# Patient Record
Sex: MALE | Race: WHITE | NOT HISPANIC OR LATINO | Employment: FULL TIME | URBAN - METROPOLITAN AREA
[De-identification: names, ages, dates, MRNs, and addresses within clinical notes are randomized per-mention and may not be internally consistent; named-entity substitution may affect disease eponyms.]

---

## 2024-08-07 ENCOUNTER — OFFICE VISIT (OUTPATIENT)
Dept: URGENT CARE | Facility: CLINIC | Age: 24
End: 2024-08-07
Payer: COMMERCIAL

## 2024-08-07 VITALS
TEMPERATURE: 98.1 F | DIASTOLIC BLOOD PRESSURE: 80 MMHG | HEIGHT: 74 IN | WEIGHT: 275 LBS | RESPIRATION RATE: 12 BRPM | BODY MASS INDEX: 35.29 KG/M2 | SYSTOLIC BLOOD PRESSURE: 120 MMHG | HEART RATE: 71 BPM | OXYGEN SATURATION: 97 %

## 2024-08-07 DIAGNOSIS — H00.015 HORDEOLUM EXTERNUM OF LEFT LOWER EYELID: ICD-10-CM

## 2024-08-07 PROCEDURE — 99204 OFFICE O/P NEW MOD 45 MIN: CPT | Performed by: NURSE PRACTITIONER

## 2024-08-07 PROCEDURE — 3074F SYST BP LT 130 MM HG: CPT | Performed by: NURSE PRACTITIONER

## 2024-08-07 PROCEDURE — 3079F DIAST BP 80-89 MM HG: CPT | Performed by: NURSE PRACTITIONER

## 2024-08-07 RX ORDER — SULFAMETHOXAZOLE/TRIMETHOPRIM 800-160 MG
1 TABLET ORAL 2 TIMES DAILY
Qty: 10 TABLET | Refills: 0 | Status: SHIPPED | OUTPATIENT
Start: 2024-08-07 | End: 2024-08-12

## 2024-08-07 RX ORDER — ERYTHROMYCIN 5 MG/G
1 OINTMENT OPHTHALMIC 4 TIMES DAILY
Qty: 1 G | Refills: 0 | Status: SHIPPED | OUTPATIENT
Start: 2024-08-07

## 2024-08-08 ASSESSMENT — ENCOUNTER SYMPTOMS
EYE PAIN: 1
DOUBLE VISION: 0
FOREIGN BODY SENSATION: 0
PHOTOPHOBIA: 0
BLURRED VISION: 0
EYE REDNESS: 1
FEVER: 0
EYE DISCHARGE: 0
EYE ITCHING: 0

## 2024-08-08 NOTE — PROGRESS NOTES
"Subjective:   Huber Mccormick is a 23 y.o. male who presents for Eye Problem (L eye swelling, ear rajwinder, jaw popping, rajwinder)      Eye Problem   The left eye is affected. This is a new problem. The current episode started yesterday. The problem occurs constantly. The problem has been gradually worsening. There was no injury mechanism. The pain is moderate. There is No known exposure to pink eye. He Does not wear contacts. Associated symptoms include eye redness. Pertinent negatives include no blurred vision, eye discharge, double vision, fever, foreign body sensation, itching or photophobia. He has tried nothing for the symptoms. The treatment provided no relief.       Review of Systems   Constitutional:  Negative for fever and malaise/fatigue.   Eyes:  Positive for pain and redness. Negative for blurred vision, double vision, photophobia, discharge and itching.       Medications:    erythromycin Oint  sulfamethoxazole-trimethoprim    Allergies: Patient has no known allergies.    Problem List: Huber Mccormick does not have a problem list on file.    Surgical History:  No past surgical history on file.    Past Social Hx: Huber Mccormick  reports that he has never smoked. He has never used smokeless tobacco. He reports that he does not currently use alcohol.     Past Family Hx:  Huber Mccormick family history is not on file.     Problem list, medications, and allergies reviewed by myself today in Epic.     Objective:     /80   Pulse 71   Temp 36.7 °C (98.1 °F) (Temporal)   Resp 12   Ht 1.88 m (6' 2\")   Wt 125 kg (275 lb)   SpO2 97%   BMI 35.31 kg/m²     Physical Exam  Constitutional:       Appearance: Normal appearance. He is not ill-appearing or toxic-appearing.   HENT:      Head: Normocephalic. Left periorbital erythema present.      Comments: Mild erythema periorbital lower as no tenderness     Right Ear: External ear normal.      Left Ear: External ear normal.      Nose: Nose " normal.      Mouth/Throat:      Lips: Pink.   Eyes:      General: Lids are normal. No allergic shiner.        Left eye: Hordeolum present.     Conjunctiva/sclera:      Left eye: Left conjunctiva is not injected.     Pulmonary:      Effort: Pulmonary effort is normal. No accessory muscle usage.   Musculoskeletal:      Cervical back: Full passive range of motion without pain.   Neurological:      Mental Status: He is alert and oriented to person, place, and time.   Psychiatric:         Mood and Affect: Mood normal.         Thought Content: Thought content normal.         Assessment/Plan:     Diagnosis and associated orders:     1. Hordeolum externum of left lower eyelid  erythromycin 5 MG/GM Ointment    sulfamethoxazole-trimethoprim (BACTRIM DS) 800-160 MG tablet         Comments/MDM:     I personally reviewed prior external notes and prior test results pertinent to today's visit.  Concerning of developing abscess as he does have mild erythema we will treat with oral Bactrim.  Warm compresses noted  Discussed management options, risks and benefits, and alternatives to treatment plan agreed upon.   Red flags discussed and indications to immediately call 911 or present to the Emergency Department.   Supportive care, differential diagnoses, and indications for immediate follow-up discussed with patient.    Patient expresses understanding and agrees to plan. Patient denies any other questions or concerns.            Please note that this dictation was created using voice recognition software. I have made a reasonable attempt to correct obvious errors, but I expect that there are errors of grammar and possibly content that I did not discover before finalizing the note.    This note was electronically signed by Roland BELTRAN.